# Patient Record
Sex: MALE | Race: WHITE | Employment: UNEMPLOYED | URBAN - METROPOLITAN AREA
[De-identification: names, ages, dates, MRNs, and addresses within clinical notes are randomized per-mention and may not be internally consistent; named-entity substitution may affect disease eponyms.]

---

## 2021-05-29 ENCOUNTER — HOSPITAL ENCOUNTER (EMERGENCY)
Age: 51
Discharge: HOME | End: 2021-05-29
Attending: EMERGENCY MEDICINE
Payer: COMMERCIAL

## 2021-05-29 VITALS
SYSTOLIC BLOOD PRESSURE: 130 MMHG | BODY MASS INDEX: 28.36 KG/M2 | TEMPERATURE: 97.9 F | OXYGEN SATURATION: 100 % | HEART RATE: 70 BPM | RESPIRATION RATE: 18 BRPM | DIASTOLIC BLOOD PRESSURE: 85 MMHG | WEIGHT: 209.1 LBS

## 2021-05-29 DIAGNOSIS — S61.011A LACERATION OF RIGHT THUMB WITHOUT FOREIGN BODY WITHOUT DAMAGE TO NAIL, INITIAL ENCOUNTER: Primary | ICD-10-CM

## 2021-05-29 PROCEDURE — 99282 EMERGENCY DEPT VISIT SF MDM: CPT

## 2021-05-29 NOTE — DISCHARGE INSTRUCTIONS
It was a pleasure taking care of you in our Emergency Department today. We know that when you come to [facility], you are entrusting us with your health, comfort, and safety. Our physicians and nurses honor that trust, and truly appreciate the opportunity to care for you and your loved ones. We also value your feedback. If you receive a survey about your Emergency Department experience today, please fill it out. We care about our patients' feedback, and we listen to what you have to say. Thank you!

## 2021-05-29 NOTE — ED PROVIDER NOTES
EMERGENCY DEPARTMENT HISTORY AND PHYSICAL EXAM      Date: 5/29/2021  Patient Name: Shaila Pate  Patient Age and Sex: 52 y.o. male    History of Presenting Illness     No chief complaint on file. History Provided By: Patient    Ability to gather history was limited by:     HPI: Shaila Pate, 52 y.o. 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 Injury occurred immediately prior to ED arrival.  Moderate severity burning pain, mild to moderate bleeding. Patient is left-handed. Location:    Quality:      Severity:    Duration:   Timing:      Context:    Modifying factors:   Associated symptoms:     Past History      The patient's medical, surgical, and social history on file were reviewed by me today.  The family history was reviewed by me today and was non-contributory, unless otherwise specified below:    Past Medical History:  Past Medical History:   Diagnosis Date    Ill-defined condition     GERD       Past Surgical History:  History reviewed. No pertinent surgical history. Family History:  History reviewed. No pertinent family history. Social History:  Social History     Tobacco Use    Smoking status: Never Smoker    Smokeless tobacco: Never Used   Substance Use Topics    Alcohol use: No    Drug use: No       Current Medications:  No current facility-administered medications on file prior to encounter. No current outpatient medications on file prior to encounter. Allergies:  No Known Allergies  Review of Systems    A complete ROS was reviewed by me today and was negative, unless otherwise specified below:    Review of Systems   Skin: Positive for wound. All other systems reviewed and are negative. Physical Exam   Vital Signs  No data found. Physical Exam  Vitals and nursing note reviewed. Constitutional:       General: He is not in acute distress. Appearance: He is not ill-appearing. Musculoskeletal:         General: No tenderness. Normal range of motion. Skin:     General: Skin is warm and dry. Findings: Wound present. Comments: 1x1 cm flap of skin removed over the right thumb PIP    No laceration amenable to closure   Neurological:      General: No focal deficit present. Mental Status: He is alert and oriented to person, place, and time. Sensory: Sensation is intact. No sensory deficit. Motor: Motor function is intact. No weakness. Psychiatric:         Behavior: Behavior normal.         Cognition and Memory: Cognition normal.         Diagnostic Study Results   Labs  No results found for this or any previous visit (from the past 24 hour(s)). Radiologic Studies  No orders to display     CT Results  (Last 48 hours)    None        CXR Results  (Last 48 hours)    None          Billable Procedures   Procedures    Cardiac monitoring was not ordered for this patient. Medical Decision Making     I reviewed the patient's most recent Emergency Dept notes and diagnostic tests in formulating my MDM on today's visit. Provider Notes (Medical Decision Making):   51-year-old male complaining of laceration to the right thumb. On examination there is a 1 x 1 cm flap of skin which has been removed, similar to an abrasion. Very superficial.  There is no laceration amenable to closure. Wound needs to heal by secondary intention. Wound was cleaned and dressed. Stable for discharge. No antibiotics indicated. No x-rays indicated. No signs of foreign body. Oleksandr Johnson MD  6:20 PM  5/29/2021     Consults:    Social History     Tobacco Use    Smoking status: Never Smoker    Smokeless tobacco: Never Used   Substance Use Topics    Alcohol use: No    Drug use: No       Medications Administered during ED course:  Medications - No data to display       Prescriptions from today's ED visit:  There are no discharge medications for this patient. Diagnosis and Disposition     Disposition:  Discharged    Clinical Impression:   1. Laceration of right thumb without foreign body without damage to nail, initial encounter        Attestation:  I personally performed the services described in this documentation on this date 5/29/2021 for patient Jenny Dorman. Jessa Lee MD        I was the first provider for this patient on this visit. To the best of my ability I reviewed relevant prior medical records, electrocardiograms, laboratories, and radiologic studies. The patient's presenting problems were discussed, and the patient was in agreement with the care plan formulated and outlined with them. Jessa Lee MD    Please note that this dictation was completed with Dragon voice recognition software. Quite often unanticipated grammatical, syntax, homophones, and other interpretive errors are inadvertently transcribed by the computer software. Please disregard these errors and excuse any errors that have escaped final proofreading.

## 2021-05-29 NOTE — ED NOTES
Patient here with c/o laceration to right thumb. Patient states that he was at work working with a  when he sliced the finger. Patient states the accident occurred less than an hour PTA. Patient with active bleeding on arrival.              Emergency Department Nursing Plan of Care       The Nursing Plan of Care is developed from the Nursing assessment and Emergency Department Attending provider initial evaluation. The plan of care may be reviewed in the ED Provider note.     The Plan of Care was developed with the following considerations:   Patient / Family readiness to learn indicated by:verbalized understanding  Persons(s) to be included in education: patient  Barriers to Learning/Limitations:No    Signed     Yonis Whitehead RN    2021   8:53 AM

## 2021-05-29 NOTE — LETTER
[facility] EMERGENCY DEPT 
[address] [id number] 
[phone] Work/School Note Date: 5/29/2021 To Whom It May concern: 
 
Marsha Moreno was seen and treated today in the emergency room by the following provider(s): 
Attending Provider: Carmenza Hendricks MD. Marsha Moreno may return to work on Thursday June 3rd. He sustained a laceration to the right thumb, which will require 3 to 5 days for adequate healing. He may be limited in his ability to work in the [employer] or contact other food with his hands for at least a week.  
 
Sincerely, 
 
 
 
 
Marek Suazo MD